# Patient Record
Sex: FEMALE | Race: WHITE | Employment: OTHER | URBAN - METROPOLITAN AREA
[De-identification: names, ages, dates, MRNs, and addresses within clinical notes are randomized per-mention and may not be internally consistent; named-entity substitution may affect disease eponyms.]

---

## 2018-07-26 ENCOUNTER — HOSPITAL ENCOUNTER (OUTPATIENT)
Dept: PHYSICAL THERAPY | Age: 23
Discharge: HOME | End: 2018-07-26
Payer: COMMERCIAL

## 2018-07-26 PROCEDURE — 97110 THERAPEUTIC EXERCISES: CPT

## 2018-07-26 PROCEDURE — 97161 PT EVAL LOW COMPLEX 20 MIN: CPT

## 2018-07-26 PROCEDURE — 97140 MANUAL THERAPY 1/> REGIONS: CPT

## 2018-07-26 NOTE — PROGRESS NOTES
PT INITIAL EVALUATION NOTE - [facility] 2-15    Patient Name: Marcel Piedra  Date:2018  : 1995  [x]  Patient  Verified  Payor: BLUE JAMES / Plan: Kika Weaver [id number] PPO / Product Type: PPO /    In time:1:40pm  Out time: 2:20pm  Total Treatment Time (min): 40  Total Timed Codes (min): 10  1:1 Treatment Time ( only): --   Visit #: 1     Treatment Area: Left leg pain [M79.605]    SUBJECTIVE  Pain Level (0-10 scale): -7/10  Any medication changes, allergies to medications, adverse drug reactions, diagnosis change, or new procedure performed?: [] No    [x] Yes (see summary sheet for update)  Subjective:    Pt reports onset of left anterior knee/proximal tibial pain in March, while studying abroad in [place]. Pt was doing a lot of running and walking and thinks she did \"too much too soon\", which caused pain in left knee. Pt has participated in cross country running throughout high school and college. Last year, pt decided to take a break from running, due to demands of academia, and feels that when she starting running again, left knee started bothering her. Pt admits to wearing flip flops during long walks and has not given much consideration to footwear. PLOF: no left knee pain  Mechanism of Injury: overuse, poor footwear  Previous Treatment/Compliance: rest, ice  PMHx/Surgical Hx: none noted  Work Hx: full-time college student, home on summer break; works part-time as an intern at [employer] Situation: lives with parents  Pt Goals: \"I hope I can run soon again without pain. \"  Barriers: pain  Motivation: high  Substance use: none  FABQ Score: low        OBJECTIVE/EXAMINATION  Posture:  Weight shifted to the right in standing  Gait and Functional Mobility:  unremarkable  Palpation: tenderness to palpation left ITB    Left Knee ROM: full, pain-free    Joint Mobility Assessment: anterior rotation of left innominate    Flexibility: tightness left gastrocnemius and soleus muscles; left quad    LOWER QUARTER   MUSCLE STRENGTH  KEY       R  L  0 - No Contraction  Knee ext  --  5/5  1 - Trace            flex  --  5/5  2 - Poor   Hip ext   --  4/5  3 - Fair          flex   --  5/5  4 - Good         abd  --  3+/5  5 - Normal         add  --  5/5      Ankle DF  --  5/5                PF  --  5/5        Neurological: Reflexes / Sensations: intact bilateral LEs  Special Tests: Trendelenberg: negative    Stork: negative      Kb: positive     Kate: negative                 H.S. SLR: positive    Piriformis Ext: negative      Long Sit: positive     Hip Scour: negative      Knee Varus/Valgus Stress: negative  Knee Lachmans: negative      Single Limb Squat: positive    10 min Therapeutic Exercise:  [x] See flow sheet :   Rationale: increase ROM, increase strength and improve coordination to improve the patients ability to perform functional activities    10 min Manual Therapy: MET to correct left innominate anterior rotation with resisted hip abduction and adduction with pt in hook-lying   Rationale: decrease pain, increase ROM, increase tissue extensibility and increase postural awareness to improve the patients ability to return to running          With   [] TE   [] TA   [] neuro   [] other: Patient Education: [x] Review HEP- pt given handout with exercises for HEP    [] Progressed/Changed HEP based on:   [] positioning   [] body mechanics   [] transfers   [] heat/ice application    [] other:      Other Objective/Functional Measures: FOTO: 56/100    Pain Level (0-10 scale) post treatment: 0/10    ASSESSMENT/Changes in Function:   Pt is a 26year old female who is referred to Physical Therapy by Dr. Lazaro with a diagnosis of left ITB syndrome and proximal tibial pain. Pt demonstrates anterior rotation of left innominate- corrected with muscle energy technique. Weakness noted in left hip abductors and core stabilizing muscles.   Patient will benefit from skilled PT services to modify and progress therapeutic interventions, address functional mobility deficits, address ROM deficits, address strength deficits, analyze and address soft tissue restrictions, analyze and cue movement patterns, analyze and modify body mechanics/ergonomics, assess and modify postural abnormalities and address muscular imbalances to attain pt/PT goals.     [x]  See Plan of [address]. Mackenzie Martins, PT, DPT   7/26/2018  2:40 PM

## 2018-07-26 NOTE — PROGRESS NOTES
[facility]  [address] [facility], [address] [place] [address]  Phone: [phone]  Fax: [phone]    Plan of Care/Statement of Necessity for Physical Therapy Services  2-15    Patient name: Aravind Munoz  : 1995  Provider#: [id number]  Referral source: Elba Jones MD      Medical/Treatment Diagnosis: Left leg pain [M79.605]     Prior Hospitalization: see medical history     Comorbidities: none noted  Prior Level of Function: full-time college student; completes 20 minutes of exercise at least 3x/week  Medications: Verified on Patient Summary List    Start of Care: 2018     Onset Date: 2018       The Plan of Care and following information is based on the information from the initial evaluation. Assessment/ key information: Pt is a 26year old female who is referred to Physical Therapy by Dr. Chantell Ulloa with a diagnosis of left ITB syndrome and proximal tibial pain. Pt demonstrates anterior rotation of left innominate- corrected with muscle energy technique. Weakness noted in left hip abductors and core stabilizing muscles. Patient will benefit from skilled PT services to modify and progress therapeutic interventions, address functional mobility deficits, address ROM deficits, address strength deficits, analyze and address soft tissue restrictions, analyze and cue movement patterns, analyze and modify body mechanics/ergonomics, assess and modify postural abnormalities and address muscular imbalances to attain pt/PT goals.     Evaluation Complexity History MEDIUM  Complexity : 1-2 comorbidities / personal factors will impact the outcome/ POC ; Examination HIGH Complexity : 4+ Standardized tests and measures addressing body structure, function, activity limitation and / or participation in recreation  ;Presentation LOW Complexity : Stable, uncomplicated  ;Clinical Decision Making MEDIUM Complexity : FOTO score of 26-74  Overall Complexity Rating: LOW     Problem List: pain affecting function, decrease ROM, decrease strength, impaired gait/ balance, decrease ADL/ functional abilitiies, decrease activity tolerance, decrease flexibility/ joint mobility and decrease transfer abilities   Treatment Plan may include any combination of the following: Therapeutic exercise, Therapeutic activities, Neuromuscular re-education, Physical agent/modality, Gait/balance training, Manual therapy and Patient education  Patient / Family readiness to learn indicated by: asking questions, trying to perform skills and interest  Persons(s) to be included in education: patient (P)  Barriers to Learning/Limitations: None  Patient Goal (s): I hope I can run soon again without pain.   Patient Self Reported Health Status: good  Rehabilitation Potential: good    Short/Long Term Goals: To be accomplished in 2 weeks:  1)  Pt will be Independent with HEP. 2) Pt will be able to squat to retrieve item from ground without increase of pain or left knee valgus compensation. 3) Pt will be able to return to running without left knee pain. 4) Pt will report improvement in overall functional mobility, as measured by FOTO, with an increased score of at least 21 points, from 56 to 77. Frequency / Duration: Patient to be seen 2 times per week for 2 weeks. Patient/ Caregiver education and instruction: self care, activity modification and exercises    [x]  Plan of care has been reviewed with DALI Soto PT, DPT   7/26/2018 3:22 PM    ________________________________________________________________________    I certify that the above Therapy Services are being furnished while the patient is under my care. I agree with the treatment plan and certify that this therapy is necessary.     [de-identified] Signature:____________________  Date:____________Time: _________

## 2018-07-31 ENCOUNTER — HOSPITAL ENCOUNTER (OUTPATIENT)
Dept: PHYSICAL THERAPY | Age: 23
Discharge: HOME | End: 2018-07-31
Payer: COMMERCIAL

## 2018-07-31 PROCEDURE — 97110 THERAPEUTIC EXERCISES: CPT | Performed by: PHYSICAL THERAPY ASSISTANT

## 2018-07-31 NOTE — PROGRESS NOTES
PT DAILY TREATMENT NOTE 2-15 Patient Name: Honorio Ba Date:2018 : 1995 [x]  Patient  Verified Payor: BLUE CROSS / Plan: [facility] PPO / Product Type: PPO / In time:2:00 PM  Out time:3:10 PM 
Total Treatment Time (min): 70 
1:1 time: 50 minutes Visit #: 2 Treatment Area: Left leg pain [M79.605] SUBJECTIVE Pain Level (0-10 scale): 0/10 Any medication changes, allergies to medications, adverse drug reactions, diagnosis change, or new procedure performed?: [x] No    [] Yes (see summary sheet for update) Subjective functional status/changes:   [] No changes reported \"It aches every now and then but much better since I started the exercises and stretches. \" States she has been compliant with HEP, ice application as well as taking Aleve. States greatest discomfort is ascending>descending stairs, prolonged standing/walking and transfers. OBJECTIVE Modality rationale: decrease edema, decrease inflammation and decrease pain to improve the patients ability to run, perform lunges, navigate stairs, transfers without pain/limitation Min Type Additional Details  
 [] Estim: []Att   []Unatt        []TENS instruct []IFC  []Premod   []NMES []Other:  []w/US   []w/ice   []w/heat Position: Location:  
 []  Traction: [] Cervical       []Lumbar 
                     [] Prone          []Supine []Intermittent   []Continuous Lbs: 
[] before manual 
[] after manual 
[]w/heat  
 []  Ultrasound: []Continuous   [] Pulsed at:  
                         []1MHz   []3MHz Location: 
W/cm2: -  Paraffin Location: 
-w/heat  
10 [x]  Ice     []  Heat 
[]  Ice massage Position: supine with LE's elevated Location: L knee  
 []  Laser 
[]  Other: Position: Location:  
 []  Vasopneumatic Device Pressure:       [] lo [] med [] hi  
Temperature:   
[x] Skin assessment post-treatment:  [x]intact []redness- no adverse reaction 
  []redness  adverse reaction:  
 
60 min Therapeutic Exercise:  [x] See flow sheet : reviewed HEP, added recumbent elliptical, SLS using mirror for visual feedback, TG LP to 90 degrees Rationale: increase strength, improve coordination, improve balance and increase proprioception to improve the patients ability to run, perform lunges, navigate stairs, transfers without pain/limitation With 
 [x] TE 
 [] TA 
 [] neuro 
 [] other: Patient Education: [x] Review HEP [x] Progressed/Changed HEP based on: lateral stepping using red band, SLS with glut activation 
[] positioning   [] body mechanics   [] transfers   [x] heat/ice application   
[] other:   
 
Other Objective/Functional Measures: nt  
 
Pain Level (0-10 scale) post treatment: 0/10 ASSESSMENT/Changes in Function: Significant muscle fasciculations noted during supine SLR hip flexion. Cues to maintain quad set to avoid extension lag. Also challenged during SLS balance, minor cues to maintain neutral pelvis. Used mirror for visual feedback and encouraged patient to activate glutes duirng SLS. Patient will continue to benefit from skilled PT services to modify and progress therapeutic interventions, address functional mobility deficits, address strength deficits, analyze and cue movement patterns and instruct in home and community integration to attain remaining goals. []  See Plan of Care 
[]  See progress note/recertification 
[]  See Discharge Summary Progress towards goals / Updated goals: 
nt 
 
PLAN 
[]  Upgrade activities as tolerated     [x]  Continue plan of care 
[]  Update interventions per flow sheet      
[]  Discharge due to:_ 
[]  Other:_   
 
Rj Haile, PTA 7/31/2018  2:00 PM

## 2018-08-02 ENCOUNTER — HOSPITAL ENCOUNTER (OUTPATIENT)
Dept: PHYSICAL THERAPY | Age: 23
Discharge: HOME | End: 2018-08-02
Payer: COMMERCIAL

## 2018-08-02 PROCEDURE — 97140 MANUAL THERAPY 1/> REGIONS: CPT

## 2018-08-02 PROCEDURE — 97110 THERAPEUTIC EXERCISES: CPT

## 2018-08-02 NOTE — PROGRESS NOTES
PT DAILY TREATMENT NOTE 2-15 Patient Name: Ernesto Welch Date:2018 : 1995 [x]  Patient  Verified Payor: BLUE CROSS / Plan: [facility] PPO / Product Type: PPO / In time: 3:30pm Out time: 4:45pm 
Total Treatment Time (min): 75 
1:1 time (min): 65 Visit #: 3 Treatment Area: Left leg pain [M79.605] SUBJECTIVE Pain Level (0-10 scale): 7/10 Any medication changes, allergies to medications, adverse drug reactions, diagnosis change, or new procedure performed?: [x] No    [] Yes (see summary sheet for update) Subjective functional status/changes:   [] No changes reported Pt complains of \"achy\" pain in left knee, located in anterior knee. OBJECTIVE 55 min Therapeutic Exercise:  [x] See flow sheet : reviewed HEP, added recumbent elliptical, SLS using mirror for visual feedback, TG LP to 90 degrees Rationale: increase strength, improve coordination, improve balance and increase proprioception to improve the patients ability to run, perform lunges, navigate stairs, transfers without pain/limitation 10 min Manual Therapy: left prone quad stretch; DTM to left ITB Rationale: decrease pain, increase tissue extensibility and increase postural awareness to improve the patients ability to perform functional and recreational activities With 
 [x] TE 
 [] TA 
 [] neuro 
 [] other: Patient Education: [x] Review HEP [x] Progressed/Changed HEP based on: lateral stepping using red band, SLS with glut activation 
[] positioning   [] body mechanics   [] transfers   [x] heat/ice application   
[] other:   
 
Other Objective/Functional Measures: checked pelvic alignment= good Pain Level (0-10 scale) post treatment: 0/10 ASSESSMENT/Changes in Function: Attempted patellar taping to decrease lateral tracking and pain but not effective. Pt fatigues quickly with quad and gluteal strengthening; pt denies pain.   Patient will continue to benefit from skilled PT services to modify and progress therapeutic interventions, address functional mobility deficits, address strength deficits, analyze and cue movement patterns and instruct in home and community integration to attain remaining goals. []  See Plan of Care 
[]  See progress note/recertification 
[]  See Discharge Summary Progress towards goals / Updated goals: 
Short/Long Term Goals: To be accomplished in 2 weeks: 
1)  Pt will be Independent with HEP. 2) Pt will be able to squat to retrieve item from ground without increase of pain or left knee valgus compensation. 3) Pt will be able to return to running without left knee pain. 4) Pt will report improvement in overall functional mobility, as measured by FOTO, with an increased score of at least 21 points, from 56 to 77. PLAN [x]  Upgrade activities as tolerated     [x]  Continue plan of care 
[]  Update interventions per flow sheet      
[]  Discharge due to:_ 
[]  Other:_   
 
Coral Ho, PT 8/2/2018  2:00 PM

## 2018-08-06 ENCOUNTER — HOSPITAL ENCOUNTER (OUTPATIENT)
Dept: PHYSICAL THERAPY | Age: 23
Discharge: HOME | End: 2018-08-06
Payer: COMMERCIAL

## 2018-08-06 PROCEDURE — 97140 MANUAL THERAPY 1/> REGIONS: CPT

## 2018-08-06 PROCEDURE — 97110 THERAPEUTIC EXERCISES: CPT

## 2018-08-06 NOTE — PROGRESS NOTES
PT DAILY TREATMENT NOTE 2-15    Patient Name: Yaya Carlson  Date:2018  : 1995  [x]  Patient  Verified  Payor: BLUE CROSS / Plan: [facility] PPO / Product Type: PPO /    In time: 3:35pm Out time: 4:30pm  Total Treatment Time (min): 55  1:1 time (min): 55  Visit #: 4     Treatment Area: Left leg pain [M79.605]    SUBJECTIVE  Pain Level (0-10 scale): 0/10  Any medication changes, allergies to medications, adverse drug reactions, diagnosis change, or new procedure performed?: [x] No    [] Yes (see summary sheet for update)  Subjective functional status/changes:   [] No changes reported  Pt is pleased with how quickly she has noticed improvement in left knee pain.     OBJECTIVE  Modality rationale: decrease inflammation and prevent soreness to improve the patients ability to return to running   Min Type Additional Details    [] Estim: []Att   []Unatt        []TENS instruct                  []IFC  []Premod   []NMES                     []Other:  []w/US   []w/ice   []w/heat  Position:  Location:    []  Traction: [] Cervical       []Lumbar                       [] Prone          []Supine                       []Intermittent   []Continuous Lbs:  [] before manual  [] after manual  []w/heat    []  Ultrasound: []Continuous   [] Pulsed at:                           []1MHz   []3MHz Location:  W/cm2:    [] Paraffin         Location:   []w/heat   -- [x]  Ice to go    []  Heat  []  Ice massage Position: --  Location: left knee    []  Laser  []  Other: Position:  Location:      []  Vasopneumatic Device Pressure:       [] lo [] med [] hi   Temperature:    [x] Skin assessment post-treatment:  [x]intact []redness- no adverse reaction    []redness  adverse reaction:     45 min Therapeutic Exercise:  [x] See flow sheet : reviewed HEP, added recumbent elliptical, SLS using mirror for visual feedback, TG LP to 90 degrees   Rationale: increase strength, improve coordination, improve balance and increase proprioception to improve the patients ability to run, perform lunges, navigate stairs, transfers without pain/limitation    10 min Manual Therapy: left prone quad stretch; DTM to left ITB    Rationale: decrease pain, increase tissue extensibility and increase postural awareness to improve the patients ability to perform functional and recreational activities          With   [x] TE   [] TA   [] neuro   [] other: Patient Education: [x] Review HEP    [] Progressed/Changed HEP based on:   [] positioning   [] body mechanics   [] transfers   [x] heat/ice application    [] other:      Other Objective/Functional Measures: --     Pain Level (0-10 scale) post treatment: 0/10    ASSESSMENT/Changes in Function:   []  See Plan of Care  []  See progress note/recertification  [x]  See Discharge Summary         Progress towards goals / Updated goals:  Short/Long Term Goals: To be accomplished in 2 weeks:  1)  Pt will be Independent with HEP. MET  2) Pt will be able to squat to retrieve item from ground without increase of pain or left knee valgus compensation. MET  3) Pt will be able to return to running without left knee pain. MET  4) Pt will report improvement in overall functional mobility, as measured by FOTO, with an increased score of at least 21 points, from 56 to 77.  NOT ASSESSED    PLAN  []  Upgrade activities as tolerated     []  Continue plan of care  []  Update interventions per flow sheet       [x]  Discharge due to:_ goals met  []  Other:_      Jenny Stewart, PT 8/6/2018  2:00 PM

## 2018-08-06 NOTE — ANCILLARY DISCHARGE INSTRUCTIONS
[facility]  [address]  [place], [address]  Phone: [phone]  Fax: [phone]    Discharge Summary  2-15    Patient name: Disha Godfrey  : 1995  Provider#:[id number]  Referral source: Meagan Sow MD      Medical/Treatment Diagnosis: Left leg pain [M79.605]     Prior Hospitalization: see medical history     Comorbidities: none noted  Prior Level of Function:full-time college student; completes 20 minutes of exercise at least 3x/week  Medications: Verified on Patient Summary List    Start of Care: 2018     Onset Date:2018   Visits from Start of Care: 4     Missed Visits: 0  Reporting Period : 2018 to 2018    Progress towards goals / Updated goals:  Short/Long Term Goals: To be accomplished in 2 weeks:  1)  Pt will be Independent with [facility]. MET  2) Pt will be able to squat to retrieve item from ground without increase of pain or left knee valgus compensation. MET  3) Pt will be able to return to running without left knee pain. MET  4) Pt will report improvement in overall functional mobility, as measured by FOTO, with an increased score of at least 21 points, from 56 to 77. NOT ASSESSED    ASSESSMENT/SUMMARY OF CARE: Pt participated in 4 outpatient PT sessions, 2018-2018, for left knee pain. Treatment included therapeutic exercise, manual therapy, pt education, cryotherapy and home exercise program.  Pt progressed well with PT- reported improvement in left knee pain from 7/10 to 0/10. Pt demonstrated improved quad control and ability to perform functional activities without increase in pain. Pt is returning to college, out of town; therefore, pt is discharged to [facility] at this time. Discussed progression of return to running; pt verbalized understanding.     RECOMMENDATIONS:  [x]Discontinue therapy: [x]Patient has reached or is progressing toward set goals      []Patient is non-compliant or has abdicated      []Due to lack of appreciable progress towards set goals    Jenny Stewart PT, DPT  8/6/2018 4:37 PM

## 2018-08-08 ENCOUNTER — APPOINTMENT (OUTPATIENT)
Dept: PHYSICAL THERAPY | Age: 23
End: 2018-08-08
Payer: COMMERCIAL